# Patient Record
Sex: MALE | Race: WHITE | Employment: OTHER | URBAN - METROPOLITAN AREA
[De-identification: names, ages, dates, MRNs, and addresses within clinical notes are randomized per-mention and may not be internally consistent; named-entity substitution may affect disease eponyms.]

---

## 2024-09-30 ENCOUNTER — OFFICE VISIT (OUTPATIENT)
Dept: PHYSICAL MEDICINE AND REHAB | Facility: CLINIC | Age: 34
End: 2024-09-30

## 2024-09-30 ENCOUNTER — HOSPITAL ENCOUNTER (OUTPATIENT)
Dept: GENERAL RADIOLOGY | Facility: HOSPITAL | Age: 34
Discharge: HOME | End: 2024-09-30
Attending: PHYSICAL MEDICINE & REHABILITATION

## 2024-09-30 VITALS — HEIGHT: 67 IN | BODY MASS INDEX: 27.47 KG/M2 | WEIGHT: 175 LBS

## 2024-09-30 DIAGNOSIS — M67.911 DYSFUNCTION OF RIGHT ROTATOR CUFF: Primary | ICD-10-CM

## 2024-09-30 DIAGNOSIS — M25.511 CHRONIC RIGHT SHOULDER PAIN: ICD-10-CM

## 2024-09-30 DIAGNOSIS — M54.50 ACUTE RIGHT-SIDED LOW BACK PAIN WITHOUT SCIATICA: ICD-10-CM

## 2024-09-30 DIAGNOSIS — G89.29 CHRONIC RIGHT SHOULDER PAIN: ICD-10-CM

## 2024-09-30 DIAGNOSIS — M75.31 CALCIFIC TENDONITIS OF RIGHT SHOULDER: ICD-10-CM

## 2024-09-30 DIAGNOSIS — M67.911 DYSFUNCTION OF RIGHT ROTATOR CUFF: ICD-10-CM

## 2024-09-30 DIAGNOSIS — M54.16 LUMBAR RADICULOPATHY: ICD-10-CM

## 2024-09-30 PROCEDURE — 72110 X-RAY EXAM L-2 SPINE 4/>VWS: CPT | Performed by: PHYSICAL MEDICINE & REHABILITATION

## 2024-09-30 PROCEDURE — 73030 X-RAY EXAM OF SHOULDER: CPT | Performed by: PHYSICAL MEDICINE & REHABILITATION

## 2024-09-30 PROCEDURE — 99204 OFFICE O/P NEW MOD 45 MIN: CPT | Performed by: PHYSICAL MEDICINE & REHABILITATION

## 2024-09-30 PROCEDURE — 76881 US COMPL JOINT R-T W/IMG: CPT | Performed by: PHYSICAL MEDICINE & REHABILITATION

## 2024-09-30 NOTE — PROGRESS NOTES
Chief Complaint:    Chief Complaint   Patient presents with    New Patient     New patient id here for right shoulder pain and right lower back pain .States the pain comes from an old injury. Reports n/t in shoulder/arm .Pain comes mainly at night time  and reports stiffness throughout the arm. Reports intermittent stabbing pain in the back. No current physical therapy. Not taking any pain meds or muscle relaxer's. No injections or recent imaging.  Pain 2/10       Cervical Pain H & P    HPI:  Luis E Bautista is a 34 year old year old right handed male with a prior history of right arm pain do to an overhead injury about 6-7 years ago.  He has seen doctors and PT's who have told him that he has a supraspinatus injury.  He has had a right shoulder x-ray in the past.  Th PT has not helped him and the pain can be worse after the PT.  He still has a cracking noise in the right shoulder with movement.  He has been seeing a chiropractor that will give him temporary relief.  The pain and the cracking are in the area of the right upper trapezius and he will radiation of the pain down the right arm and into the middle finger.  Massage therapy will help and he does eluogio every 2-3 weeks.      Description of the Pain  The pain is located in the  right upper trapezius .    The pain radiates down the right arm to the 3rd finger.  The pain at its best is 1/10. The pain at its worst is 4/10. The pain is currently  1/10.  The pain is described as a(n) dull sensation.    The patient reports no numbness.  The patient reports tingling in the right shoulder, right upper lateral arm, and right lateral forearm.  There is not weakness in bilateral hands and arms.  The pain is worse sitting and holding the baby with the right arm .   The pain is better  sleeping prone with the right arm on a pillow and abducted to 90 degrees and flexed at the elbow. .    Low Back Pain H & P    He developed right low back pain about 2 months ago.  He acutely  developed right low back pain that will radiate to the right flank or abdominal region. He will have a sharp pain in the back and he will get a numbness in the right flank.  He will have the pain in the flank with reaching and the low back pain when going from sitting to standing and when sitting.  He denies any leg weakness.       Past Medical History   History reviewed. No pertinent past medical history.    Past Surgical History   History reviewed. No pertinent surgical history.    Family History   History reviewed. No pertinent family history.    Social History   Social History     Socioeconomic History    Marital status:      Spouse name: Not on file    Number of children: Not on file    Years of education: Not on file    Highest education level: Not on file   Occupational History    Not on file   Tobacco Use    Smoking status: Never     Passive exposure: Never    Smokeless tobacco: Never   Vaping Use    Vaping status: Never Used   Substance and Sexual Activity    Alcohol use: Yes    Drug use: Never    Sexual activity: Not on file   Other Topics Concern    Caffeine Concern Yes    Exercise Yes    Seat Belt Not Asked    Special Diet Not Asked    Stress Concern Not Asked    Weight Concern Not Asked   Social History Narrative    Not on file     Social Determinants of Health     Financial Resource Strain: Not on File (3/8/2022)    Received from [facility]    Financial Resource Strain     Financial Resource Strain: 0   Food Insecurity: Not on File (9/26/2024)    Received from [facility].    Food Insecurity     Food: 0   Transportation Needs: Not on File (3/8/2022)    Received from [facility].    Transportation Needs     Transportation: 0   Physical Activity: Not on File (3/8/2022)    Received from [facility].    Physical Activity     Physical Activity: 0   Stress: Not on File (3/8/2022)    Received from [facility].    Stress     Stress: 0   Social Connections: Not on File (9/13/2024)    Received from [facility].    Social Connections      Connectedness: 0   Housing Stability: Not on File (3/8/2022)    Received from [facility]    Oatmeal Stability     Housin       Review of Systems  Patient-reported ROS  Constitutional  Sleep Disturbance: admits  Chills: denies  Fever: denies  Weight Gain: denies  Weight Loss: denies   Cardiovascular  Chest Pain: denies  Irregular Heartbeat: denies   Respiratory  Painful Breathing: denies  Wheezing: denies   Gastrointestinal  Bowel Incontinence: denies  Heartburn: denies  Abdominal Pain: denies  Blood in Stool : denies  Rectal Pain: denies   Hematology  Easy Bruising: denies  Easy Bleeding: denies   Genitourinary  Difficulty Urinating: denies  Bladder Incontinence: denies  Pelvic Pain: denies  Painful Urination: denies   Musculoskeletal  Joint Stiffness: admits  Painful Joints: denies  Tailbone Pain: denies  Swollen Joints: denies   Peripheral Vascular  Swelling of Legs/Feet: denies  Cold Extremities: denies   Skin  Open Sores: denies  Nodules or Lumps: denies  Rash: denies   Neurological  Loss of Strength Since last Visit: denies  Tingling/Numbness: admits  Balance: denies   Psychiatric  Anxiety: denies  Depressed Mood: denies        PE:  The patient does appear in his stated age in no distress.  The patient is well groomed.    Psychiatric:  The patient is alert and oriented x 3.  The patient has a normal affect and mood.      Respiratory:  No acute respiratory distress. Patient does not have a cough.    HEENT:  Extraocular muscles are intact. There is no kern icterus. Pupils are equal, round, and reactive to light. No redness or discharge bilaterally.    Skin:  There are no rashes or lesions.    Lymph Nodes:  The patient has no palpable submandibular, supraclavicular, and cervical lymph nodes..    Vitals:  There were no vitals filed for this visit.    Cervical Spine:    Posture: normal chin forward superiorly rotated protracted shoulder posture.   Shoulders: Level   Head: In neutral   Spinous Processes Palpations:  Non-tender for all Spinous Processes   Z-Joints Palpations: Non-tender for all Z-joints   Muscular Palpations: Non-tender to palpation.   Cervical Flexion: 45 degrees Painless   Cervical Extension: 60 degrees Painless   RIGHT rotation: 90 degrees Painless   LEFT rotation: 90 degrees Painless     Vascular upper extremity:   Right radial pulses: 2+   Left radial pulses: 2+     Neurological Upper Extremity:    Light Touch: Intact in Bilateral upper extremities.   Pin Prick: Not tested.   UE Muscle Strength: All Upper Extremity strength measurements 5/5 except:  Shoulder external rotators Right: 3+/5  Serratus anterior Right: 3+/5   Reflexes: 2+ In the bilateral upper extremities.   Giraldo's sign Right: Negative   Giraldo's sigh Left: Negative     Shoulder: The shoulders are stable.    Medial Border Scapular Winging: absent   Right Scapula: normal alignment   Left Scapula: normal alignment   Palpation: Non-tender shoulder palpations.   Right Internal Rotation: normal   Left Internal Rotation: normal   Right External Rotation: normal   Left External Rotation: normal   Shoulder Special Tests: Right Terrazas test: Positive  Left Terrazas test: Negative  Right Neers test: Positive  Left Neers test: Negative  Right external rotation: Negative  Left external rotation: Negative     C-Spine Special Tests  Special Tests: All special tests negative     Gait:    Gait: Normal gait   Sit to Stand: no difficulty   RIGHT Walking on Toes: no difficulty   LEFT Walking on Toes: no difficulty   RIGHT Walking on Heels: no difficulty   LEFT Walking on Heels: no difficulty     Lumbar Spine:    Scoliosis: No scoliosis present   Lumbar Flexion: 110 degrees Gives the patient pain in the right low back.   Lumbar Extension: 40 degrees Gives the patient pain in the right low back.     Lumbar Spine Palpation:    Spinous Processes: Non-tender for all Spinous Processes   Z-joints: Non-tender for all Z-joints   SIJ: Non-tender for bilateral SIJ    Piriformis Muscle: Non-tender bilateral Piriformis muscles   Greater Trochanteric Bursa: Non-tender for bilateral Greater Trochanteric Bursa     Vascular lower extremity:   Dorsalis pedis pulse-RIGHT 2+   Dorsalis pedis pulse-LEFT 2+   Tibialis posterior pulse-RIGHT 2+   Tibialis posterior pulse-LEFT 2+      Neurological Lower Extremity:    Light Touch Sensation: Intact in bilateral Lower Extremities   LE Muscle Strength: All LE strength measurements 5/5 except:  Hamstring RIGHT:   4-/5   RIGHT plantar reflexes: downward response   LEFT plantar reflexes: downward response   Reflexes: 2+ in bilateral lower extremities except:  Right medial hamstring which are absent     Hip: Hips are stable.    RIGHT hip ROM normal   LEFT hip ROM normal   RIGHT hip flexion Negative pain   LEFT hip flexion Negative pain   RIGHT hip SENA test Negative for pain   LEFT hip SENA test Negative for pain   RIGHT hip internal rotation Negative for pain   LEFT hip internal rotation Negative for pain   RIGHT hip piriformis stretch test Negative for pain   LEFT hip piriformis stretch test Negative for pain     Neural Tension Tests Lumbar Spine:  Sitting straight leg raise-RIGHT Positive for pain in the popliteal fossa and the right lateral lower leg   Sitting straight leg raise-LEFT Positive for pain in th left lateral lower leg   Supine straight leg raise-RIGHT Negative for pain   Supine straight leg raise-LEFT Negative for pain   Slump test-RIGHT Negative for pain   Slump test-LEFT Negative for pain     Lymph Nodes:   Inguinal Lymph Nodes Absent     Assessment  1. Dysfunction of right rotator cuff    2. Chronic right shoulder pain    3. Acute right-sided low back pain without sciatica    4. right L5 radiculopathy    5. Calcific tendonitis of right shoulder: moderate in supraspinatus      Plan  I did a diagnostic ultrasound of the right shoulder in the office. (See procedure note)    He will get lumbar spine x-rays.    He will get into the  PT for the lumbar spine.    I will due a lavage of the right supraspinatus calcific tendonitis in the future.    He will follow up 2-4 weeks after having the above procedure.    The patient understands and agrees with the stated plan.    Cory Weston MD  9/30/2024

## 2024-09-30 NOTE — PATIENT INSTRUCTIONS
Plan  I will do a diagnostic ultrasound of the right shoulder in the office.    He will get lumbar spine x-rays.    He will get into the PT for the lumbar spine.    He will follow up after I have the diagnostic ultrasound of the right shoulder and he has had a few weeks of the PT.

## 2024-09-30 NOTE — PROCEDURES
I did an ultrasound examination of the right shoulder in the office today:   Biceps Tendon:   Normal   Subscapularis Tendon:  Small-mild articular surface partial thickness tear  AC Joint:   Normal  Impingement Sign:  Normal  Subdeltoid Bursa:  Normal  Posterior Glenohumeral Joint:  Normal    Posterior Glenoid Labrum: Normal  Infraspinatus Tendon:  Small-mild articular surface partial thickness tear  Teres Minor Tendon:  Normal  Supraspinatus Tendon:  Moderate-large calcific nodules with tendon swelling.    These images are permanently stored in the ultrasound unit or PACS system.

## 2024-10-07 ENCOUNTER — TELEPHONE (OUTPATIENT)
Dept: PHYSICAL MEDICINE AND REHAB | Facility: CLINIC | Age: 34
End: 2024-10-07

## 2024-10-07 NOTE — TELEPHONE ENCOUNTER
Patient had his X-Rays performed on 9/30/20224 and order therapy- and patient is wondering what to do next.     Patient had ultrasound of right shoulder already. Patient had x-rays.     Patient is inquiring about \"I will due a lavage of the right supraspinatus calcific tendonitis in the future. \" From the 9/30/2024 OV notes,  and if he should do PT before or after above procedure or wait until after. Order is in for the shoulder lavage and still shows open. This RN routing to Referrals to follow up. Patient insurance states \"none\"-not sure if this is  or not.     This RN notes that PT of right shoulder and PT of right low back is on one PT order.    This RN recommended that patient call to schedule his PT now if he plans to do at [facility], as it can take a while to get in for the first appt- provided patient with rehab central scheduling number: [phone].    Routed to Dr. Weston to advise recommendations after viewing imaging .       LOV: 09/30/2024 w/ Dr. Weston  LOV PLAN:  \"Plan  I did a diagnostic ultrasound of the right shoulder in the office. (See procedure note)     He will get lumbar spine x-rays.     He will get into the PT for the lumbar spine.     I will due a lavage of the right supraspinatus calcific tendonitis in the future.     He will follow up 2-4 weeks after having the above procedure.\"

## 2024-10-07 NOTE — TELEPHONE ENCOUNTER
ight shoulder calcific tendon nodule lavage and injection under ultrasound guidance.  CPT Code: 62485, 93811, 98416 & Dx: M25.511     No insurance has been entered, can you please confirm insurance.

## 2024-10-08 NOTE — TELEPHONE ENCOUNTER
His lumbar spine is stable and x-ray confirms the calcific tendonitis.    He is self pay, so please let him know the cost of the procedure.

## 2024-11-19 ENCOUNTER — OFFICE VISIT (OUTPATIENT)
Dept: PHYSICAL MEDICINE AND REHAB | Facility: CLINIC | Age: 34
End: 2024-11-19

## 2024-11-19 DIAGNOSIS — M25.511 CHRONIC RIGHT SHOULDER PAIN: ICD-10-CM

## 2024-11-19 DIAGNOSIS — M67.911 DYSFUNCTION OF RIGHT ROTATOR CUFF: ICD-10-CM

## 2024-11-19 DIAGNOSIS — M75.31 CALCIFIC TENDONITIS OF RIGHT SHOULDER: Primary | ICD-10-CM

## 2024-11-19 DIAGNOSIS — G89.29 CHRONIC RIGHT SHOULDER PAIN: ICD-10-CM

## 2024-11-19 PROCEDURE — 20611 DRAIN/INJ JOINT/BURSA W/US: CPT | Performed by: PHYSICAL MEDICINE & REHABILITATION

## 2024-11-19 RX ORDER — SODIUM CHLORIDE 9 MG/ML
3 INJECTION, SOLUTION INTRAMUSCULAR; INTRAVENOUS; SUBCUTANEOUS ONCE
Status: COMPLETED | OUTPATIENT
Start: 2024-11-19 | End: 2024-11-19

## 2024-11-19 RX ORDER — LIDOCAINE HYDROCHLORIDE 10 MG/ML
2.5 INJECTION, SOLUTION INFILTRATION; PERINEURAL ONCE
Status: COMPLETED | OUTPATIENT
Start: 2024-11-19 | End: 2024-11-19

## 2024-11-19 RX ORDER — TRIAMCINOLONE ACETONIDE 40 MG/ML
60 INJECTION, SUSPENSION INTRA-ARTICULAR; INTRAMUSCULAR ONCE
Status: COMPLETED | OUTPATIENT
Start: 2024-11-19 | End: 2024-11-19

## 2024-11-19 RX ADMIN — SODIUM CHLORIDE 3 ML: 9 INJECTION, SOLUTION INTRAMUSCULAR; INTRAVENOUS; SUBCUTANEOUS at 15:36:00

## 2024-11-19 RX ADMIN — LIDOCAINE HYDROCHLORIDE 2.5 ML: 10 INJECTION, SOLUTION INFILTRATION; PERINEURAL at 15:35:00

## 2024-11-19 RX ADMIN — TRIAMCINOLONE ACETONIDE 60 MG: 40 INJECTION, SUSPENSION INTRA-ARTICULAR; INTRAMUSCULAR at 15:36:00

## 2024-11-22 NOTE — PROCEDURES
Right supraspinatus tendon calcific mitral lavage and aspiration with a right subdeltoid bursal steroid injection done under ultrasound guidance:  The calcific tendinitis of the right supraspinatus tendon was identified and a spencer was placed on the patient's skin overlying the calcific nodule within the tendon.  Patient skin was cleaned with Betadine swabs x 3 and anesthetized with cold spray.  A 22-gauge needle was inserted and directed under ultrasound guidance to the calcific nodule in the supraspinatus tendon.  When the nodule was engaged, aspiration was performed followed by.  No mass, fluid, or particles could be aspirated out of the calcific nodule.  After multiple attempts, the needle was retracted into the subdeltoid bursa.  At this point in time, the subdeltoid bursa was injected with a mL solution of 1-1/2 mL of 40 mg of Kenalog per mL and 2-1/2 mL of 1% lidocaine without epinephrine.  Needle was removed.  The patient's skin was cleaned.  Triple antibiotic ointment and a Band-Aid were applied.  The patient tolerated procedure well.  Patient will let me know in 2 weeks how he is doing.    The ultrasound images were saved to the ultrasound unit will be transferred to the PACS system.

## 2024-11-26 ENCOUNTER — HOSPITAL ENCOUNTER (EMERGENCY)
Facility: HOSPITAL | Age: 34
Discharge: HOME | End: 2024-11-26
Attending: EMERGENCY MEDICINE
Payer: COMMERCIAL

## 2024-11-26 ENCOUNTER — APPOINTMENT (OUTPATIENT)
Dept: GENERAL RADIOLOGY | Facility: HOSPITAL | Age: 34
End: 2024-11-26
Attending: EMERGENCY MEDICINE
Payer: COMMERCIAL

## 2024-11-26 VITALS
HEART RATE: 61 BPM | HEIGHT: 67 IN | DIASTOLIC BLOOD PRESSURE: 92 MMHG | RESPIRATION RATE: 16 BRPM | TEMPERATURE: 98 F | SYSTOLIC BLOOD PRESSURE: 118 MMHG | WEIGHT: 175 LBS | OXYGEN SATURATION: 97 % | BODY MASS INDEX: 27.47 KG/M2

## 2024-11-26 DIAGNOSIS — M25.552 BILATERAL HIP PAIN: ICD-10-CM

## 2024-11-26 DIAGNOSIS — M54.50 BACK PAIN, LUMBOSACRAL: ICD-10-CM

## 2024-11-26 DIAGNOSIS — M25.551 BILATERAL HIP PAIN: ICD-10-CM
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
## 2024-11-26 PROCEDURE — 72110 X-RAY EXAM L-2 SPINE 4/>VWS: CPT | Performed by: EMERGENCY MEDICINE

## 2024-11-26 PROCEDURE — 73523 X-RAY EXAM HIPS BI 5/> VIEWS: CPT | Performed by: EMERGENCY MEDICINE

## 2024-11-26 PROCEDURE — 99284 EMERGENCY DEPT VISIT MOD MDM: CPT

## 2024-11-26 PROCEDURE — 73030 X-RAY EXAM OF SHOULDER: CPT | Performed by: EMERGENCY MEDICINE

## 2024-11-26 PROCEDURE — 99283 EMERGENCY DEPT VISIT LOW MDM: CPT

## 2024-11-26 RX ORDER — LIDOCAINE 50 MG/G
1 PATCH TOPICAL EVERY 24 HOURS
Qty: 10 PATCH | Refills: 0 | Status: SHIPPED | OUTPATIENT
Start: 2024-11-26

## 2024-11-26 RX ORDER — IBUPROFEN 600 MG/1
600 TABLET, FILM COATED ORAL ONCE
Status: COMPLETED | OUTPATIENT
Start: 2024-11-26 | End: 2024-11-26

## 2024-11-26 RX ORDER — CYCLOBENZAPRINE HCL 10 MG
10 TABLET ORAL ONCE
Status: COMPLETED | OUTPATIENT
Start: 2024-11-26 | End: 2024-11-26

## 2024-11-26 RX ORDER — CYCLOBENZAPRINE HCL 10 MG
10 TABLET ORAL 3 TIMES DAILY PRN
Qty: 20 TABLET | Refills: 0 | Status: SHIPPED | OUTPATIENT
Start: 2024-11-26 | End: 2024-12-03

## 2024-11-26 RX ORDER — IBUPROFEN 600 MG/1
600 TABLET, FILM COATED ORAL EVERY 8 HOURS PRN
Qty: 20 TABLET | Refills: 0 | Status: SHIPPED | OUTPATIENT
Start: 2024-11-26 | End: 2024-12-03
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
## 2024-11-26 NOTE — DISCHARGE INSTRUCTIONS
Take All medication as prescribed.  Follow-up with your primary care provider in 1 to 2 days.  Return to the ER if symptoms continue, get worse, unable to follow-up  
sensory intact
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
## (undated) NOTE — LETTER
AUTHORIZATION FOR SURGICAL OPERATION OR OTHER PROCEDURE    1. I hereby authorize Dr. Cory Weston and the [facility] staff assigned to my case to perform the following operation and/or procedure at the [facility]:    Right shoulder calcific tendon nodule lavage and injection under ultrasound guidance.     2.  My physician has explained the nature and purpose of the operation or other procedure, possible alternative methods of treatment, the risks involved, and the possibility of complication to me.  I acknowledge that no guarantee has been made as to the result that may be obtained.  3.  I recognize that, during the course of this operation, or other procedure, unforseen conditions may necessitate additional or different procedure than those listed above.  I, therefore, further authorize and request that the above named physician, his/her physician assistants or designees perform such procedures as are, in his/her professional opinion, necessary and desirable.  4.  Any tissue or organs removed in the operation or other procedure may be disposed of by and at the discretion of the [facility] staff and [facility].  5.  I understand that in the event of a medical emergency, I will be transported by local paramedics to [facility] or other hospital emergency department.  6.  I certify that I have read and fully understand the above consent to operation and/or other procedure.    7.  I acknowledge that my physician has explained sedation/analgesia administration to me including the risks and benefits.  I consent to the administration of sedation/analgesia as may be necessary or desirable in the judgement of my physician.    Witness signature: ___________________________________________________ Date:  ______/______/_____                    Time:  ________ A.M.  P.M.       Patient Name: Luis E Bautista  7/16/1990  [id number]         Patient signature:   ___________________________________________________        Statement of Physician  My signature below affirms that prior to the time of the procedure, I have explained to the patient and/or his/her guardian, the risks and benefits involved in the proposed treatment and any reasonable alternative to the proposed treatment.  I have also explained the risks and benefits involved in the refusal of the proposed treatment and have answered the patient's questions.                        Date:  ______/______/_______  Provider                      Signature:  __________________________________________________________       Time:  ___________ ARHONDA ROSADO

## (undated) NOTE — LETTER
Date: 2024      Patient Name: Luis E Bautista      : 1990        Thank you for choosing [facility] as your health care provider. Your physician has deemed the following medical service(s) necessary. However, your insurance plan may not pay for all of your health care and costs and may deny payment for this service. The fact that your insurance plan does not pay for an item or service does not mean you should not receive it. The purpose of this form is to help you make an informed decision about whether or not you want to receive this service(s) that may not be paid for by your insurance plan.    CPT Code Description     Cost       ultrasound of the right shoulder         I understand that the above mentioned service(s) or supply may not be covered by my insurance company. I agree to be financially responsible for the cost of this service or supply in the event of my insurance denies payment as a non-covered benefit.        ______________________________________________________________________  Signature of Patient or Patient's Representative  Relationship  Date    ______________________________________________________________________  Signature of Witness to signing of form   Printed Name

## (undated) NOTE — LETTER
Date: 2024      Patient Name: Luis E Bautista      : 1990        Thank you for choosing [facility] as your health care provider. Your physician has deemed the following medical service(s) necessary. However, your insurance plan may not pay for all of your health care and costs and may deny payment for this service. The fact that your insurance plan does not pay for an item or service does not mean you should not receive it. The purpose of this form is to help you make an informed decision about whether or not you want to receive this service(s) that may not be paid for by your insurance plan.    CPT Code Description     Cost     Right shoulder calcific tendon nodule lavage and injection under ultrasound guidance.       I understand that the above mentioned service(s) or supply may not be covered by my insurance company. I agree to be financially responsible for the cost of this service or supply in the event of my insurance denies payment as a non-covered benefit.        ______________________________________________________________________  Signature of Patient or Patient's Representative  Relationship  Date    ______________________________________________________________________  Signature of Witness to signing of form   Printed Name

## (undated) NOTE — LETTER
AUTHORIZATION FOR SURGICAL OPERATION OR OTHER PROCEDURE    1. I hereby authorize Dr. Cory Weston and the [facility] staff assigned to my case to perform the following operation and/or procedure at the [facility]:      ultrasound of the right shoulder       2.  My physician has explained the nature and purpose of the operation or other procedure, possible alternative methods of treatment, the risks involved, and the possibility of complication to me.  I acknowledge that no guarantee has been made as to the result that may be obtained.  3.  I recognize that, during the course of this operation, or other procedure, unforseen conditions may necessitate additional or different procedure than those listed above.  I, therefore, further authorize and request that the above named physician, his/her physician assistants or designees perform such procedures as are, in his/her professional opinion, necessary and desirable.  4.  Any tissue or organs removed in the operation or other procedure may be disposed of by and at the discretion of the [facility] staff and [facility].  5.  I understand that in the event of a medical emergency, I will be transported by local paramedics to [facility] or other hospital emergency department.  6.  I certify that I have read and fully understand the above consent to operation and/or other procedure.    7.  I acknowledge that my physician has explained sedation/analgesia administration to me including the risks and benefits.  I consent to the administration of sedation/analgesia as may be necessary or desirable in the judgement of my physician.    Witness signature: ___________________________________________________ Date:  ______/______/_____                    Time:  ________ A.M.  P.M.       Patient Name: Luis E Bautista  7/16/1990  [id number]       Patient signature:  ___________________________________________________        Statement of Physician  My signature  below affirms that prior to the time of the procedure, I have explained to the patient and/or his/her guardian, the risks and benefits involved in the proposed treatment and any reasonable alternative to the proposed treatment.  I have also explained the risks and benefits involved in the refusal of the proposed treatment and have answered the patient's questions.                        Date:  ______/______/_______  Provider                      Signature:  __________________________________________________________       Time:  ___________ A.M    P.M.

## (undated) NOTE — LETTER
9/30/2024      Cory Weston MD  Physical Medicine and Rehabilitation  [address] [place] [address], [address]  [facility] [address]  Dept: [phone]  Dept Fax: [phone]        RE: Consultation for Luis E Bautista        Dear No primary care provider on file.,    Thank you very much for the opportunity to see your patient.  Attached please find a summary from your patient's recent visit.     I appreciate the chance to take care of your patient with you.  Please feel free to call me with any questions or concerns.    Sincerely,        Cory Weston MD  Electronically Signed on 9/30/2024